# Patient Record
Sex: MALE | Race: BLACK OR AFRICAN AMERICAN
[De-identification: names, ages, dates, MRNs, and addresses within clinical notes are randomized per-mention and may not be internally consistent; named-entity substitution may affect disease eponyms.]

---

## 2020-07-23 ENCOUNTER — HOSPITAL ENCOUNTER (EMERGENCY)
Dept: HOSPITAL 61 - ER | Age: 35
Discharge: HOME | End: 2020-07-23
Payer: SELF-PAY

## 2020-07-23 VITALS — HEIGHT: 75 IN | WEIGHT: 193.35 LBS | BODY MASS INDEX: 24.04 KG/M2

## 2020-07-23 VITALS — DIASTOLIC BLOOD PRESSURE: 85 MMHG | SYSTOLIC BLOOD PRESSURE: 138 MMHG

## 2020-07-23 DIAGNOSIS — R11.0: ICD-10-CM

## 2020-07-23 DIAGNOSIS — R10.32: Primary | ICD-10-CM

## 2020-07-23 DIAGNOSIS — R19.5: ICD-10-CM

## 2020-07-23 LAB
ALBUMIN SERPL-MCNC: 3.6 G/DL (ref 3.4–5)
ALBUMIN/GLOB SERPL: 1.2 {RATIO} (ref 1–1.7)
ALP SERPL-CCNC: 72 U/L (ref 46–116)
ALT SERPL-CCNC: 20 U/L (ref 16–63)
ANION GAP SERPL CALC-SCNC: 9 MMOL/L (ref 6–14)
APTT PPP: (no result) S
AST SERPL-CCNC: 19 U/L (ref 15–37)
BACTERIA #/AREA URNS HPF: (no result) /HPF
BASOPHILS # BLD AUTO: 0.1 X10^3/UL (ref 0–0.2)
BASOPHILS NFR BLD: 1 % (ref 0–3)
BILIRUB SERPL-MCNC: 0.8 MG/DL (ref 0.2–1)
BILIRUB UR QL STRIP: (no result)
BUN SERPL-MCNC: 7 MG/DL (ref 8–26)
BUN/CREAT SERPL: 6 (ref 6–20)
CALCIUM SERPL-MCNC: 8.8 MG/DL (ref 8.5–10.1)
CHLORIDE SERPL-SCNC: 105 MMOL/L (ref 98–107)
CO2 SERPL-SCNC: 27 MMOL/L (ref 21–32)
CREAT SERPL-MCNC: 1.2 MG/DL (ref 0.7–1.3)
EOSINOPHIL NFR BLD: 0.1 X10^3/UL (ref 0–0.7)
EOSINOPHIL NFR BLD: 1 % (ref 0–3)
ERYTHROCYTE [DISTWIDTH] IN BLOOD BY AUTOMATED COUNT: 14.5 % (ref 11.5–14.5)
FIBRINOGEN PPP-MCNC: CLEAR MG/DL
GFR SERPLBLD BASED ON 1.73 SQ M-ARVRAT: 83.9 ML/MIN
GLOBULIN SER-MCNC: 2.9 G/DL (ref 2.2–3.8)
GLUCOSE SERPL-MCNC: 85 MG/DL (ref 70–99)
HCT VFR BLD CALC: 47.7 % (ref 39–53)
HGB BLD-MCNC: 16.1 G/DL (ref 13–17.5)
LIPASE: 104 U/L (ref 73–393)
LYMPHOCYTES # BLD: 1.9 X10^3/UL (ref 1–4.8)
LYMPHOCYTES NFR BLD AUTO: 20 % (ref 24–48)
MCH RBC QN AUTO: 32 PG (ref 25–35)
MCHC RBC AUTO-ENTMCNC: 34 G/DL (ref 31–37)
MCV RBC AUTO: 94 FL (ref 79–100)
MONO #: 0.5 X10^3/UL (ref 0–1.1)
MONOCYTES NFR BLD: 5 % (ref 0–9)
NEUT #: 7.1 X10^3/UL (ref 1.8–7.7)
NEUTROPHILS NFR BLD AUTO: 74 % (ref 31–73)
NITRITE UR QL STRIP: NEGATIVE
PH UR STRIP: 6 [PH]
PLATELET # BLD AUTO: 215 X10^3/UL (ref 140–400)
POTASSIUM SERPL-SCNC: 3.8 MMOL/L (ref 3.5–5.1)
PROT SERPL-MCNC: 6.5 G/DL (ref 6.4–8.2)
PROT UR STRIP-MCNC: NEGATIVE MG/DL
RBC # BLD AUTO: 5.06 X10^6/UL (ref 4.3–5.7)
RBC #/AREA URNS HPF: 0 /HPF (ref 0–2)
SODIUM SERPL-SCNC: 141 MMOL/L (ref 136–145)
SQUAMOUS #/AREA URNS LPF: (no result) /LPF
UROBILINOGEN UR-MCNC: 1 MG/DL
WBC # BLD AUTO: 9.6 X10^3/UL (ref 4–11)
WBC #/AREA URNS HPF: (no result) /HPF (ref 0–4)

## 2020-07-23 PROCEDURE — 99285 EMERGENCY DEPT VISIT HI MDM: CPT

## 2020-07-23 PROCEDURE — 81001 URINALYSIS AUTO W/SCOPE: CPT

## 2020-07-23 PROCEDURE — 87591 N.GONORRHOEAE DNA AMP PROB: CPT

## 2020-07-23 PROCEDURE — 87491 CHLMYD TRACH DNA AMP PROBE: CPT

## 2020-07-23 PROCEDURE — 80053 COMPREHEN METABOLIC PANEL: CPT

## 2020-07-23 PROCEDURE — 74177 CT ABD & PELVIS W/CONTRAST: CPT

## 2020-07-23 PROCEDURE — 96375 TX/PRO/DX INJ NEW DRUG ADDON: CPT

## 2020-07-23 PROCEDURE — 36415 COLL VENOUS BLD VENIPUNCTURE: CPT

## 2020-07-23 PROCEDURE — 87086 URINE CULTURE/COLONY COUNT: CPT

## 2020-07-23 PROCEDURE — 96374 THER/PROPH/DIAG INJ IV PUSH: CPT

## 2020-07-23 PROCEDURE — 85025 COMPLETE CBC W/AUTO DIFF WBC: CPT

## 2020-07-23 PROCEDURE — 83690 ASSAY OF LIPASE: CPT

## 2020-07-23 NOTE — RAD
Examination: CT ABD PELV W/ IV CONTRST ONLY

 

History: Reason: lower abdominal pain / Spl. Instructions: [id number] 75ML / 

History: 

 

Comparison/Correlation: None

 

Findings: Axial images of the abdomen and pelvis were obtained following 

IV contrast. Sagittal and coronal reformatted images were provided.

 

Liver, spleen, pancreas, adrenal glands, and kidneys are normal. 

Gallbladder fossa is normal. No extraluminal gas. No bowel obstruction.

 

 No ascites or pelvic free fluid. No inflammatory findings about the 

cecum. Appendix is not definitely delineated. Assessment is limited due to

minimal mesenteric fat. Urinary bladder is mostly decompressed. No 

enlarged abdominal or pelvic lymph nodes. Slight levo convexity of the 

lumbar spine. Bony structures are otherwise unremarkable.

 

 

Impression:

No acute inflammatory process or mass. Evaluation is limited with minimal 

mesenteric fat and no oral contrast utilized for this exam. 

 

 

 

PQRS Compliance Statement:

 

One or more of the following individualized dose reduction techniques were

utilized for this examination:  

1. Automated exposure control  

2. Adjustment of the mA and/or kV according to patient size  

3. Use of iterative reconstruction technique

 

Electronically signed by: Crow Avila MD (7/23/2020 1:26 PM) [facility]

## 2020-07-23 NOTE — PHYS DOC
General Adult


EDM:


Chief Complaint:  ABDOMINAL PAIN





HPI:


HPI:





Patient is a 34  year old male patient presents with lower abdominal pain.  

Patient states he has had this abdominal pain worsening over the last couple 

days, states that he has noticed some discoloration in his stools to a yellow 

color over the last day, significantly noticing a difference today.  States he 

has had no appetite since day prior.  States abdominal pain has been constant, 

feels like a cramping pain.  States he has felt little bit nauseous, last time 

he had eaten was 24 hours ago.  Denies fever, denies cough.  Denies exposure to 

other ill persons.  States pain isolated bedside.  Denies any testicular pain, d

enies any change in urination.





Review of Systems:


Review of Systems:


Constitutional:   Denies fever or chills. []





Respiratory:   Denies cough or shortness of breath. [] 


Cardiovascular:   Denies chest pain or edema. [] 


GI:   Reports abdominal pain, nausea.  Denies vomiting.  Does report he has had 

a small normal amount of blood and some yellowish stools over the last 2 days.


:  Denies dysuria.  Denies penile discharge [] 


Musculoskeletal:   Denies back pain or joint pain. [] 


Integument:   Denies rash. [] 


Neurologic:   Denies headache, focal weakness or sensory changes. [] 


Endocrine:   Denies polyuria or polydipsia. [] 


Lymphatic:  Denies swollen glands. [] 


Psychiatric:  Denies depression or anxiety. []





Heart Score:


Risk Factors:


Risk Factors:  DM, Current or recent (<one month) smoker, HTN, HLP, family 

history of CAD, obesity.


Risk Scores:


Score 0 - 3:  2.5% MACE over next 6 weeks - Discharge Home


Score 4 - 6:  20.3% MACE over next 6 weeks - Admit for Clinical Observation


Score 7 - 10:  72.7% MACE over next 6 weeks - Early Invasive Strategies





Physical Exam:


PE:





Constitutional: Well developed, well nourished, appears uncomfortable, non-toxic

 appearance. []


HENT: Normocephalic, atraumatic, bilateral external ears normal, oropharynx 

moist, no oral exudates, nose normal. []


Eyes: PERRLA, EOMI, conjunctiva normal, no discharge. [] 


Neck: Normal range of motion, no tenderness, supple, no stridor. [] 


Cardiovascular:Heart rate regular rhythm, no murmur []


Lungs & Thorax:  Bilateral breath sounds clear to auscultation []


Abdomen: Hyperactive bowel sounds.  Abdomen soft, no masses, no pulsatile 

masses.  Negative psoas sign, positive obturator sign, moderate rebound 

tenderness [] 


Skin: Warm, dry, no erythema, no rash. [] 


Back: No tenderness, no CVA tenderness. [] 


Extremities: No tenderness, no cyanosis, no clubbing, ROM intact, no edema. [] 


Neurologic: Alert and oriented X 3, normal motor function, normal sensory 

function, no focal deficits noted. []


Psychologic: Affect normal, judgement normal, mood normal. []





EKG:


EKG:


[]





Radiology/Procedures:


Radiology/Procedures:





Comparison/Correlation: None


 


Findings: Axial images of the abdomen and pelvis were obtained following 


IV contrast. Sagittal and coronal reformatted images were provided.


 


Liver, spleen, pancreas, adrenal glands, and kidneys are normal. 


Gallbladder fossa is normal. No extraluminal gas. No bowel obstruction.


 


 No ascites or pelvic free fluid. No inflammatory findings about the 


cecum. Appendix is not definitely delineated. Assessment is limited due to


minimal mesenteric fat. Urinary bladder is mostly decompressed. No 


enlarged abdominal or pelvic lymph nodes. Slight levo convexity of the 


lumbar spine. Bony structures are otherwise unremarkable.


 


 


Impression:


No acute inflammatory process or mass. Evaluation is limited with minimal 


mesenteric fat and no oral contrast utilized for this exam. 


 


 []





Course & Med Decision Making:


Course & Med Decision Making


Pertinent Labs and Imaging studies reviewed. (See chart for details)





[] Patient does report he felt better after the nausea medication, discussed 

findings patient, with importance of follow-up, rest, hydration.  Patient 

agreement with this plan.  We will plan to discharge with antiemetics


Does report he is concerned he may have an STI, states he has never been 

tested.,  Denies any penile discharge





Dragon Disclaimer:


Dragon Disclaimer:


This electronic medical record was generated, in whole or in part, using a voice

 recognition dictation system.





Departure


Departure


Impression:  


   Primary Impression:  


   Abdominal pain


   Qualified Codes:  R10.32 - Left lower quadrant pain


   Additional Impression:  


   Nausea


Disposition:  01 HOME, SELF-CARE


Condition:  GOOD


Referrals:  


NO PCP (PCP)


Patient Instructions:  Abdominal Pain





Additional Instructions:  


As we discussed, you may take the medication as needed for your discomfort.  Try

 to rest, push you are staying hydrated with plenty of fluids.  Keep your body a

 couple day rest to to let your abdomen and improve.  You may take Tylenol or 

ibuprofen as needed for discomfort.


Scripts


Metoclopramide Hcl (REGLAN) 10 Mg Tablet


1 TAB PO TID PRN for NAUSEA for 30 Days, #30 TAB 0 Refills


   Prov: KYRA MCGARRY         7/23/20





Justicifation of Admission Dx:


Justifications for Admission:


Justification of Admission Dx:  N/A











KYRA MCGARRY              Jul 23, 2020 12:24

## 2021-05-12 ENCOUNTER — HOSPITAL ENCOUNTER (EMERGENCY)
Dept: HOSPITAL 61 - ER | Age: 36
Discharge: HOME | End: 2021-05-12
Payer: SELF-PAY

## 2021-05-12 VITALS
SYSTOLIC BLOOD PRESSURE: 134 MMHG | DIASTOLIC BLOOD PRESSURE: 78 MMHG | SYSTOLIC BLOOD PRESSURE: 134 MMHG | DIASTOLIC BLOOD PRESSURE: 78 MMHG

## 2021-05-12 VITALS — HEIGHT: 75 IN | BODY MASS INDEX: 24.53 KG/M2 | WEIGHT: 197.31 LBS

## 2021-05-12 DIAGNOSIS — R10.9: Primary | ICD-10-CM

## 2021-05-12 DIAGNOSIS — R11.2: ICD-10-CM

## 2021-05-12 LAB
ALBUMIN SERPL-MCNC: 3.3 G/DL (ref 3.4–5)
ALBUMIN/GLOB SERPL: 1.5 {RATIO} (ref 1–1.7)
ALP SERPL-CCNC: 58 U/L (ref 46–116)
ALT SERPL-CCNC: 17 U/L (ref 16–63)
ANION GAP SERPL CALC-SCNC: 7 MMOL/L (ref 6–14)
AST SERPL-CCNC: 17 U/L (ref 15–37)
BASOPHILS # BLD AUTO: 0.1 X10^3/UL (ref 0–0.2)
BASOPHILS NFR BLD: 1 % (ref 0–3)
BILIRUB SERPL-MCNC: 0.7 MG/DL (ref 0.2–1)
BUN SERPL-MCNC: 7 MG/DL (ref 8–26)
BUN/CREAT SERPL: 6 (ref 6–20)
CALCIUM SERPL-MCNC: 8.5 MG/DL (ref 8.5–10.1)
CHLORIDE SERPL-SCNC: 106 MMOL/L (ref 98–107)
CO2 SERPL-SCNC: 29 MMOL/L (ref 21–32)
CREAT SERPL-MCNC: 1.1 MG/DL (ref 0.7–1.3)
EOSINOPHIL NFR BLD: 0.1 X10^3/UL (ref 0–0.7)
EOSINOPHIL NFR BLD: 1 % (ref 0–3)
ERYTHROCYTE [DISTWIDTH] IN BLOOD BY AUTOMATED COUNT: 14.6 % (ref 11.5–14.5)
FECAL OB PT: NEGATIVE
GFR SERPLBLD BASED ON 1.73 SQ M-ARVRAT: 92.2 ML/MIN
GLUCOSE SERPL-MCNC: 85 MG/DL (ref 70–99)
HCT VFR BLD CALC: 48.4 % (ref 39–53)
HGB BLD-MCNC: 16.1 G/DL (ref 13–17.5)
LIPASE: 80 U/L (ref 73–393)
LYMPHOCYTES # BLD: 3.3 X10^3/UL (ref 1–4.8)
LYMPHOCYTES NFR BLD AUTO: 27 % (ref 24–48)
MCH RBC QN AUTO: 32 PG (ref 25–35)
MCHC RBC AUTO-ENTMCNC: 33 G/DL (ref 31–37)
MCV RBC AUTO: 95 FL (ref 79–100)
MONO #: 0.8 X10^3/UL (ref 0–1.1)
MONOCYTES NFR BLD: 6 % (ref 0–9)
NEUT #: 7.9 X10^3/UL (ref 1.8–7.7)
NEUTROPHILS NFR BLD AUTO: 65 % (ref 31–73)
PLATELET # BLD AUTO: 166 X10^3/UL (ref 140–400)
POTASSIUM SERPL-SCNC: 3.7 MMOL/L (ref 3.5–5.1)
PROT SERPL-MCNC: 5.5 G/DL (ref 6.4–8.2)
PROTHROMBIN TIME: 14.1 SEC (ref 11.7–14)
RBC # BLD AUTO: 5.12 X10^6/UL (ref 4.3–5.7)
SODIUM SERPL-SCNC: 142 MMOL/L (ref 136–145)
WBC # BLD AUTO: 12.2 X10^3/UL (ref 4–11)

## 2021-05-12 PROCEDURE — 74177 CT ABD & PELVIS W/CONTRAST: CPT

## 2021-05-12 PROCEDURE — 86901 BLOOD TYPING SEROLOGIC RH(D): CPT

## 2021-05-12 PROCEDURE — 82274 ASSAY TEST FOR BLOOD FECAL: CPT

## 2021-05-12 PROCEDURE — 96375 TX/PRO/DX INJ NEW DRUG ADDON: CPT

## 2021-05-12 PROCEDURE — 99285 EMERGENCY DEPT VISIT HI MDM: CPT

## 2021-05-12 PROCEDURE — 36415 COLL VENOUS BLD VENIPUNCTURE: CPT

## 2021-05-12 PROCEDURE — 85610 PROTHROMBIN TIME: CPT

## 2021-05-12 PROCEDURE — 85025 COMPLETE CBC W/AUTO DIFF WBC: CPT

## 2021-05-12 PROCEDURE — 86850 RBC ANTIBODY SCREEN: CPT

## 2021-05-12 PROCEDURE — 96374 THER/PROPH/DIAG INJ IV PUSH: CPT

## 2021-05-12 PROCEDURE — 96372 THER/PROPH/DIAG INJ SC/IM: CPT

## 2021-05-12 PROCEDURE — 96376 TX/PRO/DX INJ SAME DRUG ADON: CPT

## 2021-05-12 PROCEDURE — 83690 ASSAY OF LIPASE: CPT

## 2021-05-12 PROCEDURE — 86900 BLOOD TYPING SEROLOGIC ABO: CPT

## 2021-05-12 PROCEDURE — C9113 INJ PANTOPRAZOLE SODIUM, VIA: HCPCS

## 2021-05-12 PROCEDURE — 80053 COMPREHEN METABOLIC PANEL: CPT

## 2021-05-12 PROCEDURE — 96361 HYDRATE IV INFUSION ADD-ON: CPT
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
## 2021-05-12 NOTE — RAD
CT ABDOMEN+PELVIS W 



History: DARK BLOOD STOOL WITH ABD PAIN  



Comparison: 7/23/2020



Technique: After administration of intravenous contrast, helical CT of the abdomen and pelvis was per
formed from the lung bases through the ischial tuberosities. Coronal and sagittal reconstructions wer
e obtained. 75 mL of Omnipaque 300 were used. One or more of the following dose reduction techniques 
were utilized: Automated exposure control (AEC), Adjustment of mA and/or kV according to patient size
, Use of iterative reconstruction technique such as ASiR, CT scan done according to ALARA and image g
ently/image wisely



Abdomen Findings:

The visualized lung bases are clear.



The liver, gallbladder, pancreas, spleen, and bilateral adrenal glands are normal. 



Symmetric renal enhancement. There is no focal renal mass. There is no hydronephrosis.   



The visualized loops of small bowel are normal. The visualized loops of large bowel are normal. There
 is no evidence of bowel obstruction. Appendix is not seen. 



There is no free fluid.



There is no mesenteric or retroperitoneal adenopathy. 



The abdominal aorta is normal in caliber. 



Pelvis Findings:

Urinary bladder is normal. No pelvic free fluid. There is no pelvic or inguinal adenopathy.  



There is no acute bony abnormality.  



IMPRESSION:



No acute findings.



Electronically signed by: Chidi Heard MD (5/12/2021 9:44 PM) [facility]

## 2021-05-30 ENCOUNTER — HOSPITAL ENCOUNTER (EMERGENCY)
Dept: HOSPITAL 61 - ER | Age: 36
Discharge: LEFT BEFORE DISCHARGE | End: 2021-05-30
Payer: SELF-PAY

## 2021-05-30 DIAGNOSIS — Z53.21: ICD-10-CM

## 2021-05-30 DIAGNOSIS — K64.9: Primary | ICD-10-CM
